# Patient Record
Sex: FEMALE | Race: BLACK OR AFRICAN AMERICAN | NOT HISPANIC OR LATINO
[De-identification: names, ages, dates, MRNs, and addresses within clinical notes are randomized per-mention and may not be internally consistent; named-entity substitution may affect disease eponyms.]

---

## 2018-04-25 ENCOUNTER — APPOINTMENT (OUTPATIENT)
Dept: OBGYN | Facility: CLINIC | Age: 63
End: 2018-04-25
Payer: MEDICARE

## 2018-04-25 VITALS
SYSTOLIC BLOOD PRESSURE: 134 MMHG | WEIGHT: 212.06 LBS | BODY MASS INDEX: 35.33 KG/M2 | HEIGHT: 65 IN | DIASTOLIC BLOOD PRESSURE: 78 MMHG

## 2018-04-25 PROCEDURE — G0101: CPT

## 2018-04-25 PROCEDURE — 82270 OCCULT BLOOD FECES: CPT

## 2019-04-29 ENCOUNTER — APPOINTMENT (OUTPATIENT)
Dept: OBGYN | Facility: CLINIC | Age: 64
End: 2019-04-29
Payer: MEDICARE

## 2019-04-29 VITALS
BODY MASS INDEX: 34.15 KG/M2 | SYSTOLIC BLOOD PRESSURE: 116 MMHG | HEIGHT: 64 IN | DIASTOLIC BLOOD PRESSURE: 78 MMHG | WEIGHT: 200 LBS | HEART RATE: 61 BPM

## 2019-04-29 DIAGNOSIS — Z78.9 OTHER SPECIFIED HEALTH STATUS: ICD-10-CM

## 2019-04-29 DIAGNOSIS — N89.8 OTHER SPECIFIED NONINFLAMMATORY DISORDERS OF VAGINA: ICD-10-CM

## 2019-04-29 PROCEDURE — 99213 OFFICE O/P EST LOW 20 MIN: CPT

## 2019-04-29 NOTE — CHIEF COMPLAINT
[Annual Visit] : annual visit [FreeTextEntry1] : 63 yo P1 LMP 1997 s/p TARYN presents for annual Gyn exam. She reports has bumps on genital. Reports had them for "years". She reports was told by Dr. Juarez "calcium build up."  Reports "bumps is not bothering me now". She reports any vulvar pain, no irritation. No other complaints.

## 2019-04-29 NOTE — HISTORY OF PRESENT ILLNESS
[___ Month(s) Ago] : [unfilled] month(s) ago [Last Mammogram ___] : Last Mammogram was [unfilled] [Last Bone Density ___] : Last bone density studies [unfilled] [Last Colonoscopy ___] : Last colonoscopy [unfilled] [Last Pap ___] : Last cervical pap smear was [unfilled] [Postmenopausal] : is postmenopausal [HIV] : HIV - [HSV] : HSV - [RPR] : RPR - [Hepatitis] : Hepatitis - [Chlamydia] : Chlamydia - [Gonorrhea] : Gonorrhea - [HPV] : HPV - [Sexually Active] : is not sexually active [Monogamous] : is not monogamous [de-identified] : Last coitus over 10  years

## 2019-04-29 NOTE — PHYSICAL EXAM
[Awake] : awake [Alert] : alert [Soft] : soft [Oriented x3] : oriented to person, place, and time [Normal] : vagina [No Bleeding] : there was no active vaginal bleeding [Absent] : absent [Adnexa Absent] : absent bilaterally [Acute Distress] : no acute distress [Mass] : no breast mass [Nipple Discharge] : no nipple discharge [Axillary LAD] : no axillary lymphadenopathy [Tender] : non tender

## 2019-04-30 LAB
CANDIDA VAG CYTO: NOT DETECTED
G VAGINALIS+PREV SP MTYP VAG QL MICRO: NOT DETECTED
T VAGINALIS VAG QL WET PREP: NOT DETECTED

## 2020-07-06 ENCOUNTER — APPOINTMENT (OUTPATIENT)
Dept: OBGYN | Facility: CLINIC | Age: 65
End: 2020-07-06
Payer: MEDICARE

## 2020-07-06 VITALS
SYSTOLIC BLOOD PRESSURE: 112 MMHG | DIASTOLIC BLOOD PRESSURE: 82 MMHG | BODY MASS INDEX: 35.34 KG/M2 | WEIGHT: 207 LBS | HEIGHT: 64 IN

## 2020-07-06 DIAGNOSIS — Z01.419 ENCOUNTER FOR GYNECOLOGICAL EXAMINATION (GENERAL) (ROUTINE) W/OUT ABNORMAL FINDINGS: ICD-10-CM

## 2020-07-06 PROCEDURE — G0101: CPT

## 2020-07-06 PROCEDURE — 36415 COLL VENOUS BLD VENIPUNCTURE: CPT

## 2020-07-06 NOTE — HISTORY OF PRESENT ILLNESS
[___ Year(s) Ago] : [unfilled] year(s) ago [Last Mammogram ___] : Last Mammogram was [unfilled] [Last Colonoscopy ___] : Last colonoscopy [unfilled] [Last Bone Density ___] : Last bone density studies [unfilled] [Last Pap ___] : Last cervical pap smear was [unfilled] [Definite:  ___ (Date)] : the last menstrual period was [unfilled] [Sexually Active] : is not sexually active

## 2020-07-06 NOTE — CHIEF COMPLAINT
[Annual Visit] : annual visit [FreeTextEntry1] : 66 yo P1 LMP 1997 with hx of TARYN presents for annual GYN exam. She reports external genitalia dryness which causes her to have vaginal itching. She reports has had this a dryness since 1997. She states she was told by her daughter to tell her doctor. She reports uses Vaseline with alleviation of symptoms. She denies vaginal dryness

## 2020-07-20 DIAGNOSIS — B96.89 ACUTE VAGINITIS: ICD-10-CM

## 2020-07-20 DIAGNOSIS — N76.0 ACUTE VAGINITIS: ICD-10-CM

## 2020-07-21 LAB
CANDIDA VAG CYTO: NOT DETECTED
G VAGINALIS+PREV SP MTYP VAG QL MICRO: DETECTED
HSV 1+2 IGG SER IA-IMP: POSITIVE
HSV 1+2 IGG SER IA-IMP: POSITIVE
HSV1 IGG SER QL: >62.2 INDEX
HSV1 IGM SER QL: NORMAL TITER
HSV2 AB FLD-ACNC: NORMAL TITER
HSV2 IGG SER QL: 7.3 INDEX
T VAGINALIS VAG QL WET PREP: NOT DETECTED

## 2020-12-23 PROBLEM — N76.0 BACTERIAL VAGINOSIS: Status: RESOLVED | Noted: 2020-07-21 | Resolved: 2020-12-23

## 2021-09-03 ENCOUNTER — APPOINTMENT (OUTPATIENT)
Dept: OBGYN | Facility: CLINIC | Age: 66
End: 2021-09-03
Payer: MEDICARE

## 2021-09-03 VITALS
WEIGHT: 218.5 LBS | HEIGHT: 64 IN | DIASTOLIC BLOOD PRESSURE: 83 MMHG | BODY MASS INDEX: 37.3 KG/M2 | SYSTOLIC BLOOD PRESSURE: 126 MMHG

## 2021-09-03 DIAGNOSIS — Z12.39 ENCOUNTER FOR OTHER SCREENING FOR MALIGNANT NEOPLASM OF BREAST: ICD-10-CM

## 2021-09-03 PROCEDURE — 99213 OFFICE O/P EST LOW 20 MIN: CPT

## 2021-09-03 NOTE — PHYSICAL EXAM
[Appropriately responsive] : appropriately responsive [Alert] : alert [No Acute Distress] : no acute distress [No Lymphadenopathy] : no lymphadenopathy [Regular Rate Rhythm] : regular rate rhythm [No Murmurs] : no murmurs [Clear to Auscultation B/L] : clear to auscultation bilaterally [Soft] : soft [Non-tender] : non-tender [Non-distended] : non-distended [No HSM] : No HSM [No Lesions] : no lesions [No Mass] : no mass [Oriented x3] : oriented x3 [Examination Of The Breasts] : a normal appearance [No Masses] : no breast masses were palpable [Vulvar Atrophy] : vulvar atrophy [Vulvar Scarring] : vulvar scarring [Labia Majora] : normal [Labia Minora] : normal [Normal] : normal [Atrophy] : atrophy [Cystocele] : a cystocele [Dry Mucosa] : dry mucosa [Absent] : absent [Uterine Adnexae] : normal

## 2021-09-13 ENCOUNTER — RESULT REVIEW (OUTPATIENT)
Age: 66
End: 2021-09-13

## 2021-09-13 ENCOUNTER — OUTPATIENT (OUTPATIENT)
Dept: OUTPATIENT SERVICES | Facility: HOSPITAL | Age: 66
LOS: 1 days | End: 2021-09-13
Payer: MEDICARE

## 2021-09-13 ENCOUNTER — APPOINTMENT (OUTPATIENT)
Dept: MAMMOGRAPHY | Facility: CLINIC | Age: 66
End: 2021-09-13
Payer: MEDICARE

## 2021-09-13 DIAGNOSIS — Z12.39 ENCOUNTER FOR OTHER SCREENING FOR MALIGNANT NEOPLASM OF BREAST: ICD-10-CM

## 2021-09-13 DIAGNOSIS — Z90.710 ACQUIRED ABSENCE OF BOTH CERVIX AND UTERUS: Chronic | ICD-10-CM

## 2021-09-13 DIAGNOSIS — Z98.89 OTHER SPECIFIED POSTPROCEDURAL STATES: Chronic | ICD-10-CM

## 2021-09-13 DIAGNOSIS — Z98.1 ARTHRODESIS STATUS: Chronic | ICD-10-CM

## 2021-09-13 PROCEDURE — 77067 SCR MAMMO BI INCL CAD: CPT

## 2021-09-13 PROCEDURE — 77063 BREAST TOMOSYNTHESIS BI: CPT

## 2021-09-13 PROCEDURE — 77063 BREAST TOMOSYNTHESIS BI: CPT | Mod: 26

## 2021-09-13 PROCEDURE — 77067 SCR MAMMO BI INCL CAD: CPT | Mod: 26

## 2021-09-14 DIAGNOSIS — N64.89 OTHER SPECIFIED DISORDERS OF BREAST: ICD-10-CM

## 2021-09-24 ENCOUNTER — OUTPATIENT (OUTPATIENT)
Dept: OUTPATIENT SERVICES | Facility: HOSPITAL | Age: 66
LOS: 1 days | End: 2021-09-24
Payer: MEDICARE

## 2021-09-24 ENCOUNTER — APPOINTMENT (OUTPATIENT)
Dept: ULTRASOUND IMAGING | Facility: CLINIC | Age: 66
End: 2021-09-24
Payer: MEDICARE

## 2021-09-24 ENCOUNTER — RESULT REVIEW (OUTPATIENT)
Age: 66
End: 2021-09-24

## 2021-09-24 DIAGNOSIS — Z98.89 OTHER SPECIFIED POSTPROCEDURAL STATES: Chronic | ICD-10-CM

## 2021-09-24 DIAGNOSIS — Z00.00 ENCOUNTER FOR GENERAL ADULT MEDICAL EXAMINATION WITHOUT ABNORMAL FINDINGS: ICD-10-CM

## 2021-09-24 DIAGNOSIS — Z98.1 ARTHRODESIS STATUS: Chronic | ICD-10-CM

## 2021-09-24 DIAGNOSIS — Z90.710 ACQUIRED ABSENCE OF BOTH CERVIX AND UTERUS: Chronic | ICD-10-CM

## 2021-09-24 PROCEDURE — 76642 ULTRASOUND BREAST LIMITED: CPT

## 2021-09-24 PROCEDURE — 76642 ULTRASOUND BREAST LIMITED: CPT | Mod: 26,LT

## 2022-01-05 ENCOUNTER — TRANSCRIPTION ENCOUNTER (OUTPATIENT)
Age: 67
End: 2022-01-05

## 2022-01-12 ENCOUNTER — TRANSCRIPTION ENCOUNTER (OUTPATIENT)
Age: 67
End: 2022-01-12

## 2022-03-25 ENCOUNTER — OUTPATIENT (OUTPATIENT)
Dept: OUTPATIENT SERVICES | Facility: HOSPITAL | Age: 67
LOS: 1 days | End: 2022-03-25

## 2022-03-25 DIAGNOSIS — Z98.89 OTHER SPECIFIED POSTPROCEDURAL STATES: Chronic | ICD-10-CM

## 2022-03-25 DIAGNOSIS — Z00.00 ENCOUNTER FOR GENERAL ADULT MEDICAL EXAMINATION WITHOUT ABNORMAL FINDINGS: ICD-10-CM

## 2022-03-25 DIAGNOSIS — Z98.1 ARTHRODESIS STATUS: Chronic | ICD-10-CM

## 2022-03-25 DIAGNOSIS — Z90.710 ACQUIRED ABSENCE OF BOTH CERVIX AND UTERUS: Chronic | ICD-10-CM

## 2022-04-13 ENCOUNTER — RESULT REVIEW (OUTPATIENT)
Age: 67
End: 2022-04-13

## 2022-04-13 ENCOUNTER — APPOINTMENT (OUTPATIENT)
Dept: MAMMOGRAPHY | Facility: CLINIC | Age: 67
End: 2022-04-13
Payer: MEDICARE

## 2022-04-13 ENCOUNTER — OUTPATIENT (OUTPATIENT)
Dept: OUTPATIENT SERVICES | Facility: HOSPITAL | Age: 67
LOS: 1 days | End: 2022-04-13
Payer: MEDICARE

## 2022-04-13 DIAGNOSIS — Z98.89 OTHER SPECIFIED POSTPROCEDURAL STATES: Chronic | ICD-10-CM

## 2022-04-13 DIAGNOSIS — Z90.710 ACQUIRED ABSENCE OF BOTH CERVIX AND UTERUS: Chronic | ICD-10-CM

## 2022-04-13 DIAGNOSIS — Z00.00 ENCOUNTER FOR GENERAL ADULT MEDICAL EXAMINATION WITHOUT ABNORMAL FINDINGS: ICD-10-CM

## 2022-04-13 DIAGNOSIS — Z98.1 ARTHRODESIS STATUS: Chronic | ICD-10-CM

## 2022-04-13 PROCEDURE — G0279: CPT

## 2022-04-13 PROCEDURE — 77065 DX MAMMO INCL CAD UNI: CPT | Mod: 26,LT

## 2022-04-13 PROCEDURE — G0279: CPT | Mod: 26

## 2022-04-13 PROCEDURE — 77065 DX MAMMO INCL CAD UNI: CPT

## 2022-09-21 ENCOUNTER — NON-APPOINTMENT (OUTPATIENT)
Age: 67
End: 2022-09-21

## 2022-10-06 ENCOUNTER — APPOINTMENT (OUTPATIENT)
Dept: OBGYN | Facility: CLINIC | Age: 67
End: 2022-10-06

## 2022-10-06 VITALS
BODY MASS INDEX: 35.85 KG/M2 | HEIGHT: 64 IN | WEIGHT: 210 LBS | SYSTOLIC BLOOD PRESSURE: 129 MMHG | DIASTOLIC BLOOD PRESSURE: 85 MMHG

## 2022-10-06 DIAGNOSIS — N95.2 POSTMENOPAUSAL ATROPHIC VAGINITIS: ICD-10-CM

## 2022-10-06 PROCEDURE — G0101: CPT

## 2022-10-06 PROCEDURE — 99397 PER PM REEVAL EST PAT 65+ YR: CPT | Mod: GY

## 2022-10-06 NOTE — PHYSICAL EXAM
[Chaperone Present] : A chaperone was present in the examining room during all aspects of the physical examination [FreeTextEntry1] : Linda [Appropriately responsive] : appropriately responsive [Alert] : alert [No Acute Distress] : no acute distress [No Lymphadenopathy] : no lymphadenopathy [Regular Rate Rhythm] : regular rate rhythm [No Murmurs] : no murmurs [Clear to Auscultation B/L] : clear to auscultation bilaterally [Soft] : soft [Non-tender] : non-tender [Non-distended] : non-distended [No HSM] : No HSM [No Lesions] : no lesions [No Mass] : no mass [Oriented x3] : oriented x3 [Examination Of The Breasts] : a normal appearance [No Masses] : no breast masses were palpable [Vulvar Atrophy] : vulvar atrophy [Vulvar Scarring] : vulvar scarring [Labia Majora] : normal [Labia Minora] : normal [Normal] : normal [Atrophy] : atrophy [Cystocele] : a cystocele [Dry Mucosa] : dry mucosa [Absent] : absent [Uterine Adnexae] : normal

## 2022-10-08 LAB
C TRACH RRNA SPEC QL NAA+PROBE: NOT DETECTED
HPV HIGH+LOW RISK DNA PNL CVX: NOT DETECTED
N GONORRHOEA RRNA SPEC QL NAA+PROBE: NOT DETECTED
SOURCE TP AMPLIFICATION: NORMAL

## 2022-10-11 ENCOUNTER — APPOINTMENT (OUTPATIENT)
Dept: OBGYN | Facility: CLINIC | Age: 67
End: 2022-10-11

## 2022-10-11 LAB — CYTOLOGY CVX/VAG DOC THIN PREP: ABNORMAL

## 2022-12-05 ENCOUNTER — OFFICE (OUTPATIENT)
Dept: URBAN - METROPOLITAN AREA CLINIC 115 | Facility: CLINIC | Age: 67
Setting detail: OPHTHALMOLOGY
End: 2022-12-05
Payer: MEDICARE

## 2022-12-05 DIAGNOSIS — H25.12: ICD-10-CM

## 2022-12-05 DIAGNOSIS — H40.1131: ICD-10-CM

## 2022-12-05 DIAGNOSIS — H25.13: ICD-10-CM

## 2022-12-05 PROBLEM — H25.11 CATARACT SENILE NUCLEAR SCLEROSIS; RIGHT EYE, LEFT EYE, BOTH EYES: Status: ACTIVE | Noted: 2022-12-05

## 2022-12-05 PROCEDURE — 99214 OFFICE O/P EST MOD 30 MIN: CPT | Performed by: OPHTHALMOLOGY

## 2022-12-05 PROCEDURE — 92133 CPTRZD OPH DX IMG PST SGM ON: CPT | Performed by: OPHTHALMOLOGY

## 2022-12-05 PROCEDURE — 92136 OPHTHALMIC BIOMETRY: CPT | Performed by: OPHTHALMOLOGY

## 2022-12-05 ASSESSMENT — PACHYMETRY
OS_CT_UM: 484
OS_CT_CORRECTION: 4
OD_CT_UM: 490
OD_CT_CORRECTION: 4

## 2022-12-05 ASSESSMENT — REFRACTION_CURRENTRX
OD_VPRISM_DIRECTION: PROGS
OS_CYLINDER: -0.50
OS_VPRISM_DIRECTION: PROGS
OD_SPHERE: -0.50
OD_ADD: +2.50
OS_AXIS: 083
OS_SPHERE: -1.75
OS_OVR_VA: 20/
OD_AXIS: 113
OS_ADD: +2.75
OD_CYLINDER: -1.00
OD_OVR_VA: 20/

## 2022-12-05 ASSESSMENT — REFRACTION_MANIFEST
OS_ADD: +2.50
OS_CYLINDER: -1.00
OS_AXIS: 090
OU_VA: 20/NI
OD_SPHERE: +0.25
OD_ADD: +2.50
OD_VA1: 20/NI
OD_CYLINDER: -1.00
OS_SPHERE: -2.00
OD_AXIS: 110
OS_VA1: 20/NI

## 2022-12-05 ASSESSMENT — SPHEQUIV_DERIVED
OS_SPHEQUIV: -2.625
OD_SPHEQUIV: -0.5
OD_SPHEQUIV: -0.25
OS_SPHEQUIV: -2.5

## 2022-12-05 ASSESSMENT — CONFRONTATIONAL VISUAL FIELD TEST (CVF)
OS_FINDINGS: FULL
OD_FINDINGS: FULL

## 2022-12-05 ASSESSMENT — REFRACTION_AUTOREFRACTION
OS_AXIS: 079
OD_AXIS: 108
OD_CYLINDER: -1.50
OD_SPHERE: +0.25
OS_SPHERE: -2.00
OS_CYLINDER: -1.25

## 2022-12-05 ASSESSMENT — VISUAL ACUITY
OD_BCVA: 20/80
OS_BCVA: 20/60

## 2022-12-05 ASSESSMENT — TONOMETRY: OS_IOP_MMHG: 11

## 2022-12-07 ENCOUNTER — NON-APPOINTMENT (OUTPATIENT)
Age: 67
End: 2022-12-07

## 2023-02-06 ENCOUNTER — OFFICE (OUTPATIENT)
Dept: URBAN - METROPOLITAN AREA CLINIC 115 | Facility: CLINIC | Age: 68
Setting detail: OPHTHALMOLOGY
End: 2023-02-06
Payer: MEDICARE

## 2023-02-06 DIAGNOSIS — Z01.812: ICD-10-CM

## 2023-02-06 DIAGNOSIS — Z20.822: ICD-10-CM

## 2023-02-06 PROCEDURE — 99211 OFF/OP EST MAY X REQ PHY/QHP: CPT | Performed by: OPHTHALMOLOGY

## 2023-02-07 ASSESSMENT — REFRACTION_AUTOREFRACTION
OS_CYLINDER: -1.25
OD_CYLINDER: -1.50
OS_SPHERE: -2.00
OS_AXIS: 079
OD_AXIS: 108
OD_SPHERE: +0.25

## 2023-02-07 ASSESSMENT — SPHEQUIV_DERIVED
OS_SPHEQUIV: -2.5
OS_SPHEQUIV: -2.625
OD_SPHEQUIV: -0.5
OD_SPHEQUIV: -0.25

## 2023-02-07 ASSESSMENT — REFRACTION_CURRENTRX
OS_SPHERE: -1.75
OD_ADD: +2.50
OS_OVR_VA: 20/
OD_AXIS: 113
OD_SPHERE: -0.50
OS_CYLINDER: -0.50
OS_AXIS: 083
OD_VPRISM_DIRECTION: PROGS
OS_VPRISM_DIRECTION: PROGS
OD_CYLINDER: -1.00
OD_OVR_VA: 20/
OS_ADD: +2.75

## 2023-02-07 ASSESSMENT — REFRACTION_MANIFEST
OS_ADD: +2.50
OD_VA1: 20/NI
OD_CYLINDER: -1.00
OD_ADD: +2.50
OD_SPHERE: +0.25
OS_CYLINDER: -1.00
OS_AXIS: 090
OD_AXIS: 110
OS_SPHERE: -2.00
OU_VA: 20/NI
OS_VA1: 20/NI

## 2023-02-07 ASSESSMENT — VISUAL ACUITY
OS_BCVA: 20/60
OD_BCVA: 20/80

## 2023-02-09 ENCOUNTER — ASC (OUTPATIENT)
Dept: URBAN - METROPOLITAN AREA SURGERY 8 | Facility: SURGERY | Age: 68
Setting detail: OPHTHALMOLOGY
End: 2023-02-09
Payer: MEDICARE

## 2023-02-09 DIAGNOSIS — H25.12: ICD-10-CM

## 2023-02-09 DIAGNOSIS — H52.212: ICD-10-CM

## 2023-02-09 DIAGNOSIS — H40.1421: ICD-10-CM

## 2023-02-09 PROCEDURE — FEMTO CATARACT LASER: Performed by: OPHTHALMOLOGY

## 2023-02-09 PROCEDURE — 65820 GONIOTOMY: CPT | Performed by: OPHTHALMOLOGY

## 2023-02-09 PROCEDURE — 66984 XCAPSL CTRC RMVL W/O ECP: CPT | Performed by: OPHTHALMOLOGY

## 2023-02-10 ENCOUNTER — OFFICE (OUTPATIENT)
Dept: URBAN - METROPOLITAN AREA CLINIC 115 | Facility: CLINIC | Age: 68
Setting detail: OPHTHALMOLOGY
End: 2023-02-10
Payer: MEDICARE

## 2023-02-10 DIAGNOSIS — Z96.1: ICD-10-CM

## 2023-02-10 DIAGNOSIS — H25.11: ICD-10-CM

## 2023-02-10 PROCEDURE — 99024 POSTOP FOLLOW-UP VISIT: CPT | Performed by: OPHTHALMOLOGY

## 2023-02-10 PROCEDURE — 92136 OPHTHALMIC BIOMETRY: CPT | Performed by: OPHTHALMOLOGY

## 2023-02-10 ASSESSMENT — CONFRONTATIONAL VISUAL FIELD TEST (CVF)
OS_FINDINGS: FULL
OD_FINDINGS: FULL

## 2023-02-10 ASSESSMENT — REFRACTION_AUTOREFRACTION
OS_SPHERE: -2.00
OD_SPHERE: +0.25
OS_AXIS: 079
OD_CYLINDER: -1.50
OS_CYLINDER: -1.25
OD_AXIS: 108

## 2023-02-10 ASSESSMENT — REFRACTION_MANIFEST
OD_SPHERE: +0.25
OS_SPHERE: -2.00
OU_VA: 20/NI
OS_VA1: 20/NI
OD_ADD: +2.50
OS_CYLINDER: -1.00
OS_ADD: +2.50
OD_AXIS: 110
OD_CYLINDER: -1.00
OS_AXIS: 090
OD_VA1: 20/NI

## 2023-02-10 ASSESSMENT — REFRACTION_CURRENTRX
OD_VPRISM_DIRECTION: PROGS
OD_AXIS: 113
OS_OVR_VA: 20/
OS_CYLINDER: -0.50
OD_ADD: +2.50
OD_SPHERE: -0.50
OS_ADD: +2.75
OD_OVR_VA: 20/
OS_AXIS: 083
OS_SPHERE: -1.75
OD_CYLINDER: -1.00
OS_VPRISM_DIRECTION: PROGS

## 2023-02-10 ASSESSMENT — VISUAL ACUITY
OS_BCVA: 20/60-2
OD_BCVA: 20/40-1

## 2023-02-10 ASSESSMENT — SPHEQUIV_DERIVED
OS_SPHEQUIV: -2.625
OS_SPHEQUIV: -2.5
OD_SPHEQUIV: -0.5
OD_SPHEQUIV: -0.25

## 2023-02-10 ASSESSMENT — PACHYMETRY
OS_CT_CORRECTION: 4
OS_CT_UM: 484
OD_CT_CORRECTION: 4
OD_CT_UM: 490

## 2023-02-10 ASSESSMENT — TONOMETRY
OD_IOP_MMHG: 12
OS_IOP_MMHG: 11

## 2023-02-20 ENCOUNTER — OFFICE (OUTPATIENT)
Dept: URBAN - METROPOLITAN AREA CLINIC 115 | Facility: CLINIC | Age: 68
Setting detail: OPHTHALMOLOGY
End: 2023-02-20
Payer: MEDICARE

## 2023-02-20 DIAGNOSIS — Z20.822: ICD-10-CM

## 2023-02-20 DIAGNOSIS — Z01.812: ICD-10-CM

## 2023-02-20 PROCEDURE — 99211 OFF/OP EST MAY X REQ PHY/QHP: CPT | Performed by: OPHTHALMOLOGY

## 2023-02-21 ASSESSMENT — REFRACTION_CURRENTRX
OD_AXIS: 113
OS_AXIS: 083
OD_VPRISM_DIRECTION: PROGS
OS_VPRISM_DIRECTION: PROGS
OD_CYLINDER: -1.00
OS_CYLINDER: -0.50
OS_OVR_VA: 20/
OD_SPHERE: -0.50
OD_OVR_VA: 20/
OD_ADD: +2.50
OS_ADD: +2.75
OS_SPHERE: -1.75

## 2023-02-21 ASSESSMENT — SPHEQUIV_DERIVED
OS_SPHEQUIV: -2.625
OS_SPHEQUIV: -2.5
OD_SPHEQUIV: -0.5
OD_SPHEQUIV: -0.25

## 2023-02-21 ASSESSMENT — REFRACTION_MANIFEST
OS_SPHERE: -2.00
OU_VA: 20/NI
OD_AXIS: 110
OS_ADD: +2.50
OD_SPHERE: +0.25
OD_VA1: 20/NI
OS_CYLINDER: -1.00
OD_ADD: +2.50
OD_CYLINDER: -1.00
OS_AXIS: 090
OS_VA1: 20/NI

## 2023-02-21 ASSESSMENT — REFRACTION_AUTOREFRACTION
OS_AXIS: 079
OD_CYLINDER: -1.50
OS_CYLINDER: -1.25
OD_AXIS: 108
OS_SPHERE: -2.00
OD_SPHERE: +0.25

## 2023-02-21 ASSESSMENT — VISUAL ACUITY
OS_BCVA: 20/60-2
OD_BCVA: 20/40-1

## 2023-02-23 ENCOUNTER — ASC (OUTPATIENT)
Dept: URBAN - METROPOLITAN AREA SURGERY 8 | Facility: SURGERY | Age: 68
Setting detail: OPHTHALMOLOGY
End: 2023-02-23
Payer: MEDICARE

## 2023-02-23 DIAGNOSIS — H40.1411: ICD-10-CM

## 2023-02-23 DIAGNOSIS — H25.11: ICD-10-CM

## 2023-02-23 DIAGNOSIS — H52.211: ICD-10-CM

## 2023-02-23 PROCEDURE — FEMTO CATARACT LASER: Performed by: OPHTHALMOLOGY

## 2023-02-23 PROCEDURE — 66991 XCAPSL CTRC RMVL INSJ 1+: CPT | Performed by: OPHTHALMOLOGY

## 2023-02-24 ENCOUNTER — OFFICE (OUTPATIENT)
Dept: URBAN - METROPOLITAN AREA CLINIC 115 | Facility: CLINIC | Age: 68
Setting detail: OPHTHALMOLOGY
End: 2023-02-24
Payer: MEDICARE

## 2023-02-24 ENCOUNTER — RX ONLY (RX ONLY)
Age: 68
End: 2023-02-24

## 2023-02-24 DIAGNOSIS — Z96.1: ICD-10-CM

## 2023-02-24 PROCEDURE — 99024 POSTOP FOLLOW-UP VISIT: CPT | Performed by: OPTOMETRIST

## 2023-02-24 ASSESSMENT — CORNEAL EDEMA - FOLDS/STRIAE: OD_FOLDSSTRIAE: T

## 2023-02-24 ASSESSMENT — REFRACTION_MANIFEST
OS_ADD: +2.50
OD_AXIS: 110
OD_ADD: +2.50
OD_CYLINDER: -1.00
OU_VA: 20/NI
OD_SPHERE: +0.25
OS_AXIS: 090
OS_VA1: 20/NI
OD_VA1: 20/NI
OS_CYLINDER: -1.00
OS_SPHERE: -2.00

## 2023-02-24 ASSESSMENT — REFRACTION_AUTOREFRACTION
OS_SPHERE: +0.25
OS_AXIS: 034
OD_SPHERE: UTP
OD_AXIS: UTP
OS_CYLINDER: -0.75
OD_CYLINDER: UTP

## 2023-02-24 ASSESSMENT — SPHEQUIV_DERIVED
OS_SPHEQUIV: -0.125
OD_SPHEQUIV: -0.25
OS_SPHEQUIV: -2.5

## 2023-02-24 ASSESSMENT — CONFRONTATIONAL VISUAL FIELD TEST (CVF)
OS_FINDINGS: FULL
OD_FINDINGS: FULL

## 2023-02-24 ASSESSMENT — REFRACTION_CURRENTRX
OS_ADD: +2.75
OD_ADD: +2.50
OD_SPHERE: -0.50
OD_VPRISM_DIRECTION: PROGS
OD_CYLINDER: -1.00
OS_CYLINDER: -0.50
OD_OVR_VA: 20/
OS_SPHERE: -1.75
OD_AXIS: 113
OS_VPRISM_DIRECTION: PROGS
OS_AXIS: 083
OS_OVR_VA: 20/

## 2023-02-24 ASSESSMENT — VISUAL ACUITY
OS_BCVA: 20/70
OD_BCVA: 20/30-1

## 2023-03-06 ENCOUNTER — OFFICE (OUTPATIENT)
Dept: URBAN - METROPOLITAN AREA CLINIC 115 | Facility: CLINIC | Age: 68
Setting detail: OPHTHALMOLOGY
End: 2023-03-06
Payer: MEDICARE

## 2023-03-06 DIAGNOSIS — Z96.1: ICD-10-CM

## 2023-03-06 DIAGNOSIS — H40.1131: ICD-10-CM

## 2023-03-06 PROBLEM — Z01.812 ENCOUNTER FOR PREPROCEDURAL LABORATORY EXAMINATION: Status: ACTIVE | Noted: 2023-02-07

## 2023-03-06 PROCEDURE — 99024 POSTOP FOLLOW-UP VISIT: CPT | Performed by: OPHTHALMOLOGY

## 2023-03-06 ASSESSMENT — REFRACTION_MANIFEST
OS_CYLINDER: -1.00
OD_SPHERE: +0.25
OU_VA: 20/NI
OD_CYLINDER: -1.00
OD_AXIS: 110
OS_VA1: 20/NI
OS_AXIS: 090
OS_SPHERE: -2.00
OD_VA1: 20/NI
OD_ADD: +2.50
OS_ADD: +2.50

## 2023-03-06 ASSESSMENT — REFRACTION_CURRENTRX
OD_AXIS: 113
OS_OVR_VA: 20/
OS_CYLINDER: -0.50
OS_VPRISM_DIRECTION: PROGS
OD_CYLINDER: -1.00
OD_SPHERE: -0.50
OS_AXIS: 083
OD_ADD: +2.50
OS_SPHERE: -1.75
OD_OVR_VA: 20/
OS_ADD: +2.75
OD_VPRISM_DIRECTION: PROGS

## 2023-03-06 ASSESSMENT — PACHYMETRY
OD_CT_UM: 490
OD_CT_CORRECTION: 4
OS_CT_CORRECTION: 4
OS_CT_UM: 484

## 2023-03-06 ASSESSMENT — REFRACTION_AUTOREFRACTION
OD_AXIS: 169
OS_SPHERE: +0.50
OD_SPHERE: +0.50
OD_CYLINDER: -1.00
OS_CYLINDER: -1.00
OS_AXIS: 043

## 2023-03-06 ASSESSMENT — VISUAL ACUITY
OS_BCVA: 20/40-1
OD_BCVA: 20/40

## 2023-03-06 ASSESSMENT — SPHEQUIV_DERIVED
OD_SPHEQUIV: -0.25
OS_SPHEQUIV: 0
OS_SPHEQUIV: -2.5
OD_SPHEQUIV: 0

## 2023-03-06 ASSESSMENT — CONFRONTATIONAL VISUAL FIELD TEST (CVF)
OS_FINDINGS: FULL
OD_FINDINGS: FULL

## 2023-03-22 ENCOUNTER — OFFICE (OUTPATIENT)
Dept: URBAN - METROPOLITAN AREA CLINIC 115 | Facility: CLINIC | Age: 68
Setting detail: OPHTHALMOLOGY
End: 2023-03-22
Payer: MEDICARE

## 2023-03-22 ENCOUNTER — RX ONLY (RX ONLY)
Age: 68
End: 2023-03-22

## 2023-03-22 DIAGNOSIS — Z96.1: ICD-10-CM

## 2023-03-22 PROBLEM — H52.03 HYPERMETROPIA; BOTH EYES: Status: ACTIVE | Noted: 2023-03-22

## 2023-03-22 PROCEDURE — 99024 POSTOP FOLLOW-UP VISIT: CPT | Performed by: OPTOMETRIST

## 2023-03-22 ASSESSMENT — REFRACTION_CURRENTRX
OS_CYLINDER: -0.50
OS_ADD: +2.75
OD_OVR_VA: 20/
OD_VPRISM_DIRECTION: PROGS
OS_AXIS: 083
OD_SPHERE: -0.50
OS_VPRISM_DIRECTION: PROGS
OD_AXIS: 113
OS_OVR_VA: 20/
OS_SPHERE: -1.75
OD_CYLINDER: -1.00
OD_ADD: +2.50

## 2023-03-22 ASSESSMENT — REFRACTION_MANIFEST
OU_VA: 20/NI
OD_VA1: 20/NI
OS_SPHERE: 0.00
OS_AXIS: 039
OD_CYLINDER: -0.50
OD_SPHERE: +0.25
OD_VA1: 20/20
OD_AXIS: 147
OS_ADD: +2.50
OD_AXIS: 110
OD_ADD: +2.50
OD_ADD: +2.75
OS_VA1: 20/NI
OD_SPHERE: 0.00
OS_VA1: 20/20-1
OS_CYLINDER: -1.00
OS_CYLINDER: -0.75
OS_AXIS: 090
OD_CYLINDER: -1.00
OS_SPHERE: -2.00
OS_ADD: +2.75

## 2023-03-22 ASSESSMENT — SPHEQUIV_DERIVED
OS_SPHEQUIV: -2.5
OS_SPHEQUIV: -0.375
OD_SPHEQUIV: -0.25
OS_SPHEQUIV: -0.375
OD_SPHEQUIV: -0.25
OD_SPHEQUIV: -0.25

## 2023-03-22 ASSESSMENT — REFRACTION_AUTOREFRACTION
OS_SPHERE: 0.00
OD_CYLINDER: -0.50
OS_AXIS: 039
OS_CYLINDER: -0.75
OD_SPHERE: 0.00
OD_AXIS: 147

## 2023-03-22 ASSESSMENT — VISUAL ACUITY
OD_BCVA: 20/20
OS_BCVA: 20/20

## 2023-03-22 ASSESSMENT — CONFRONTATIONAL VISUAL FIELD TEST (CVF)
OS_FINDINGS: FULL
OD_FINDINGS: FULL

## 2023-03-22 ASSESSMENT — PACHYMETRY
OS_CT_UM: 484
OS_CT_CORRECTION: 4
OD_CT_UM: 490
OD_CT_CORRECTION: 4

## 2023-05-23 ENCOUNTER — OUTPATIENT (OUTPATIENT)
Dept: OUTPATIENT SERVICES | Facility: HOSPITAL | Age: 68
LOS: 1 days | End: 2023-05-23
Payer: MEDICARE

## 2023-05-23 ENCOUNTER — RESULT REVIEW (OUTPATIENT)
Age: 68
End: 2023-05-23

## 2023-05-23 ENCOUNTER — APPOINTMENT (OUTPATIENT)
Dept: MAMMOGRAPHY | Facility: CLINIC | Age: 68
End: 2023-05-23
Payer: MEDICARE

## 2023-05-23 DIAGNOSIS — Z90.710 ACQUIRED ABSENCE OF BOTH CERVIX AND UTERUS: Chronic | ICD-10-CM

## 2023-05-23 DIAGNOSIS — Z98.89 OTHER SPECIFIED POSTPROCEDURAL STATES: Chronic | ICD-10-CM

## 2023-05-23 DIAGNOSIS — Z98.1 ARTHRODESIS STATUS: Chronic | ICD-10-CM

## 2023-05-23 DIAGNOSIS — Z12.31 ENCOUNTER FOR SCREENING MAMMOGRAM FOR MALIGNANT NEOPLASM OF BREAST: ICD-10-CM

## 2023-05-23 PROCEDURE — 77067 SCR MAMMO BI INCL CAD: CPT | Mod: 26

## 2023-05-23 PROCEDURE — 77067 SCR MAMMO BI INCL CAD: CPT

## 2023-05-23 PROCEDURE — 77063 BREAST TOMOSYNTHESIS BI: CPT | Mod: 26

## 2023-05-23 PROCEDURE — 77063 BREAST TOMOSYNTHESIS BI: CPT

## 2023-05-25 ENCOUNTER — APPOINTMENT (OUTPATIENT)
Dept: OBGYN | Facility: CLINIC | Age: 68
End: 2023-05-25
Payer: MEDICARE

## 2023-05-25 VITALS
DIASTOLIC BLOOD PRESSURE: 75 MMHG | WEIGHT: 207 LBS | BODY MASS INDEX: 35.34 KG/M2 | SYSTOLIC BLOOD PRESSURE: 110 MMHG | HEIGHT: 64 IN

## 2023-05-25 DIAGNOSIS — N90.89 OTHER SPECIFIED NONINFLAMMATORY DISORDERS OF VULVA AND PERINEUM: ICD-10-CM

## 2023-05-25 PROCEDURE — 99214 OFFICE O/P EST MOD 30 MIN: CPT

## 2023-05-25 NOTE — PHYSICAL EXAM
[Chaperone Present] : A chaperone was present in the examining room during all aspects of the physical examination [FreeTextEntry1] : Linda [Vulvar Atrophy] : vulvar atrophy [Atrophy] : atrophy [Absent] : absent [de-identified] : Inclusion cysts

## 2023-06-29 ENCOUNTER — APPOINTMENT (OUTPATIENT)
Dept: OBGYN | Facility: CLINIC | Age: 68
End: 2023-06-29
Payer: MEDICARE

## 2023-06-29 VITALS — SYSTOLIC BLOOD PRESSURE: 110 MMHG | DIASTOLIC BLOOD PRESSURE: 60 MMHG | HEIGHT: 64 IN

## 2023-06-29 DIAGNOSIS — N90.7 VULVAR CYST: ICD-10-CM

## 2023-06-29 PROCEDURE — 57135 EXCISION VAGINAL CYST/TUMOR: CPT

## 2023-07-06 LAB — CORE LAB BIOPSY: NORMAL

## 2023-07-25 ENCOUNTER — APPOINTMENT (OUTPATIENT)
Dept: OBGYN | Facility: CLINIC | Age: 68
End: 2023-07-25
Payer: MEDICARE

## 2023-07-25 VITALS — HEIGHT: 64 IN | SYSTOLIC BLOOD PRESSURE: 122 MMHG | DIASTOLIC BLOOD PRESSURE: 77 MMHG

## 2023-07-25 PROCEDURE — 99214 OFFICE O/P EST MOD 30 MIN: CPT

## 2023-08-15 ENCOUNTER — OFFICE (OUTPATIENT)
Dept: URBAN - METROPOLITAN AREA CLINIC 115 | Facility: CLINIC | Age: 68
Setting detail: OPHTHALMOLOGY
End: 2023-08-15
Payer: MEDICARE

## 2023-08-15 DIAGNOSIS — H16.9: ICD-10-CM

## 2023-08-15 PROCEDURE — 92012 INTRM OPH EXAM EST PATIENT: CPT | Performed by: OPHTHALMOLOGY

## 2023-08-15 ASSESSMENT — REFRACTION_CURRENTRX
OS_VPRISM_DIRECTION: PROGS
OD_VPRISM_DIRECTION: PROGS
OD_OVR_VA: 20/
OD_SPHERE: -0.50
OD_CYLINDER: -1.00
OS_ADD: +2.75
OS_OVR_VA: 20/
OS_AXIS: 083
OS_CYLINDER: -0.50
OD_ADD: +2.50
OD_AXIS: 113
OS_SPHERE: -1.75

## 2023-08-15 ASSESSMENT — REFRACTION_MANIFEST
OD_AXIS: 147
OD_ADD: +2.75
OS_SPHERE: -2.00
OD_VA1: 20/NI
OD_CYLINDER: -0.50
OD_CYLINDER: -1.00
OD_SPHERE: +0.25
OD_SPHERE: 0.00
OS_SPHERE: 0.00
OU_VA: 20/NI
OD_VA1: 20/20
OS_AXIS: 039
OS_ADD: +2.50
OS_CYLINDER: -1.00
OD_ADD: +2.50
OS_AXIS: 090
OD_AXIS: 110
OS_ADD: +2.75
OS_VA1: 20/NI
OS_CYLINDER: -0.75
OS_VA1: 20/20-1

## 2023-08-15 ASSESSMENT — SPHEQUIV_DERIVED
OD_SPHEQUIV: 0.25
OD_SPHEQUIV: -0.25
OS_SPHEQUIV: -2.5
OS_SPHEQUIV: -0.375
OD_SPHEQUIV: -0.25
OS_SPHEQUIV: 0.125

## 2023-08-15 ASSESSMENT — PACHYMETRY
OS_CT_UM: 484
OS_CT_CORRECTION: 4
OD_CT_UM: 490
OD_CT_CORRECTION: 4

## 2023-08-15 ASSESSMENT — VISUAL ACUITY
OD_BCVA: 20/25
OS_BCVA: 20/35-1

## 2023-08-15 ASSESSMENT — TONOMETRY
OS_IOP_MMHG: 11
OD_IOP_MMHG: 10

## 2023-08-15 ASSESSMENT — CONFRONTATIONAL VISUAL FIELD TEST (CVF)
OD_FINDINGS: FULL
OS_FINDINGS: FULL

## 2023-08-15 ASSESSMENT — SUPERFICIAL PUNCTATE KERATITIS (SPK)
OS_SPK: MILD
OD_SPK: TRACE

## 2023-08-15 ASSESSMENT — REFRACTION_AUTOREFRACTION
OD_SPHERE: +0.75
OS_AXIS: 076
OS_SPHERE: +0.50
OD_AXIS: 113
OD_CYLINDER: -1.00
OS_CYLINDER: -0.75

## 2023-08-26 ENCOUNTER — OFFICE (OUTPATIENT)
Dept: URBAN - METROPOLITAN AREA CLINIC 115 | Facility: CLINIC | Age: 68
Setting detail: OPHTHALMOLOGY
End: 2023-08-26
Payer: MEDICARE

## 2023-08-26 ENCOUNTER — RX ONLY (RX ONLY)
Age: 68
End: 2023-08-26

## 2023-08-26 DIAGNOSIS — H40.1131: ICD-10-CM

## 2023-08-26 DIAGNOSIS — H16.9: ICD-10-CM

## 2023-08-26 PROCEDURE — 92012 INTRM OPH EXAM EST PATIENT: CPT | Performed by: OPHTHALMOLOGY

## 2023-08-26 ASSESSMENT — REFRACTION_CURRENTRX
OD_OVR_VA: 20/
OS_CYLINDER: -0.50
OS_ADD: +2.75
OS_AXIS: 083
OS_OVR_VA: 20/
OS_VPRISM_DIRECTION: PROGS
OD_VPRISM_DIRECTION: PROGS
OD_AXIS: 113
OD_ADD: +2.50
OD_SPHERE: -0.50
OS_SPHERE: -1.75
OD_CYLINDER: -1.00

## 2023-08-26 ASSESSMENT — PACHYMETRY
OS_CT_CORRECTION: 4
OD_CT_UM: 490
OS_CT_UM: 484
OD_CT_CORRECTION: 4

## 2023-08-26 ASSESSMENT — REFRACTION_MANIFEST
OS_CYLINDER: -0.75
OU_VA: 20/NI
OS_ADD: +2.75
OS_AXIS: 039
OD_ADD: +2.75
OD_SPHERE: 0.00
OD_CYLINDER: -0.50
OD_CYLINDER: -1.00
OD_VA1: 20/NI
OD_AXIS: 110
OS_VA1: 20/NI
OD_VA1: 20/20
OD_AXIS: 147
OD_SPHERE: +0.25
OS_SPHERE: -2.00
OS_CYLINDER: -1.00
OS_SPHERE: 0.00
OS_ADD: +2.50
OS_AXIS: 090
OS_VA1: 20/20-1
OD_ADD: +2.50

## 2023-08-26 ASSESSMENT — TONOMETRY
OS_IOP_MMHG: 11
OD_IOP_MMHG: 10

## 2023-08-26 ASSESSMENT — SPHEQUIV_DERIVED
OS_SPHEQUIV: 0.125
OS_SPHEQUIV: -0.375
OD_SPHEQUIV: 0.25
OD_SPHEQUIV: -0.25
OS_SPHEQUIV: -2.5
OD_SPHEQUIV: -0.25

## 2023-08-26 ASSESSMENT — CONFRONTATIONAL VISUAL FIELD TEST (CVF)
OS_FINDINGS: FULL
OD_FINDINGS: FULL

## 2023-08-26 ASSESSMENT — VISUAL ACUITY
OD_BCVA: 20/25-1
OS_BCVA: 20/30-1

## 2023-08-26 ASSESSMENT — REFRACTION_AUTOREFRACTION
OD_SPHERE: +0.75
OD_CYLINDER: -1.00
OS_SPHERE: +0.50
OS_CYLINDER: -0.75
OS_AXIS: 076
OD_AXIS: 113

## 2023-08-26 ASSESSMENT — SUPERFICIAL PUNCTATE KERATITIS (SPK)
OS_SPK: MILD
OD_SPK: TRACE

## 2023-12-12 ENCOUNTER — OFFICE (OUTPATIENT)
Dept: URBAN - METROPOLITAN AREA CLINIC 115 | Facility: CLINIC | Age: 68
Setting detail: OPHTHALMOLOGY
End: 2023-12-12
Payer: MEDICARE

## 2023-12-12 DIAGNOSIS — H35.033: ICD-10-CM

## 2023-12-12 DIAGNOSIS — H16.9: ICD-10-CM

## 2023-12-12 DIAGNOSIS — H40.1131: ICD-10-CM

## 2023-12-12 DIAGNOSIS — Z96.1: ICD-10-CM

## 2023-12-12 PROCEDURE — 92133 CPTRZD OPH DX IMG PST SGM ON: CPT | Performed by: OPHTHALMOLOGY

## 2023-12-12 PROCEDURE — 92012 INTRM OPH EXAM EST PATIENT: CPT | Performed by: OPHTHALMOLOGY

## 2023-12-12 ASSESSMENT — REFRACTION_AUTOREFRACTION
OD_CYLINDER: -0.50
OD_SPHERE: +0.25
OS_AXIS: 075
OS_CYLINDER: -0.50
OS_SPHERE: +0.25
OD_AXIS: 109

## 2023-12-12 ASSESSMENT — CONFRONTATIONAL VISUAL FIELD TEST (CVF)
OD_FINDINGS: FULL
OS_FINDINGS: FULL

## 2023-12-12 ASSESSMENT — REFRACTION_CURRENTRX
OD_CYLINDER: -1.00
OS_ADD: +2.75
OS_VPRISM_DIRECTION: PROGS
OS_AXIS: 083
OS_OVR_VA: 20/
OS_SPHERE: -1.75
OS_CYLINDER: -0.50
OD_VPRISM_DIRECTION: PROGS
OD_AXIS: 113
OD_SPHERE: -0.50
OD_ADD: +2.50
OD_OVR_VA: 20/

## 2023-12-12 ASSESSMENT — SPHEQUIV_DERIVED
OD_SPHEQUIV: -0.25
OD_SPHEQUIV: -0.25
OD_SPHEQUIV: 0
OS_SPHEQUIV: -2.5
OS_SPHEQUIV: 0
OS_SPHEQUIV: -0.375

## 2023-12-12 ASSESSMENT — REFRACTION_MANIFEST
OD_CYLINDER: -1.00
OD_ADD: +2.50
OS_ADD: +2.75
OS_SPHERE: 0.00
OD_VA1: 20/20
OD_VA1: 20/NI
OU_VA: 20/NI
OS_AXIS: 039
OS_AXIS: 090
OD_SPHERE: 0.00
OD_ADD: +2.75
OS_CYLINDER: -1.00
OS_SPHERE: -2.00
OS_VA1: 20/20-1
OD_AXIS: 147
OD_AXIS: 110
OD_CYLINDER: -0.50
OD_SPHERE: +0.25
OS_ADD: +2.50
OS_CYLINDER: -0.75
OS_VA1: 20/NI

## 2023-12-12 ASSESSMENT — SUPERFICIAL PUNCTATE KERATITIS (SPK)
OD_SPK: TRACE
OS_SPK: MILD

## 2024-01-22 ENCOUNTER — APPOINTMENT (OUTPATIENT)
Dept: OBGYN | Facility: CLINIC | Age: 69
End: 2024-01-22
Payer: MEDICARE

## 2024-01-22 VITALS
WEIGHT: 202.2 LBS | HEIGHT: 64 IN | BODY MASS INDEX: 34.52 KG/M2 | DIASTOLIC BLOOD PRESSURE: 84 MMHG | SYSTOLIC BLOOD PRESSURE: 112 MMHG

## 2024-01-22 DIAGNOSIS — N94.9 UNSPECIFIED CONDITION ASSOCIATED WITH FEMALE GENITAL ORGANS AND MENSTRUAL CYCLE: ICD-10-CM

## 2024-01-22 DIAGNOSIS — N90.5 ATROPHY OF VULVA: ICD-10-CM

## 2024-01-22 DIAGNOSIS — N81.11 CYSTOCELE, MIDLINE: ICD-10-CM

## 2024-01-22 PROCEDURE — 99397 PER PM REEVAL EST PAT 65+ YR: CPT

## 2024-01-22 RX ORDER — FLUCONAZOLE 150 MG/1
150 TABLET ORAL DAILY
Qty: 1 | Refills: 1 | Status: DISCONTINUED | COMMUNITY
Start: 2020-07-21 | End: 2024-01-22

## 2024-01-22 RX ORDER — TINIDAZOLE 500 MG/1
500 TABLET, FILM COATED ORAL DAILY
Qty: 8 | Refills: 1 | Status: DISCONTINUED | COMMUNITY
Start: 2020-07-21 | End: 2024-01-22

## 2024-01-22 NOTE — DISCUSSION/SUMMARY
[FreeTextEntry1] : 68-year-old -0-1-1 status post TARYN presents with right vulvar cyst and cystocele.  Physical exam showed the vulvar cyst and evidence of vulvovaginal atrophy.  Pap GC chlamydia sent, bone density ordered.  Return in 4 weeks for excision of the vulvar cyst.

## 2024-01-22 NOTE — HISTORY OF PRESENT ILLNESS
[Y] : Positive pregnancy history [N] : Patient is not sexually active [Menarche Age: ____] : age at menarche was [unfilled] [FreeTextEntry1] : 68-year-old -0-1-1 status post TARYN presents for GYN evaluation.  She complains of right vulvar cyst which mostly is nontender.  Denies discharge or itching and also denies stress incontinence. [PGHxTotal] : 2 [[facility]] : 1 [PGHxPremature] : 0 [PGHxAbortions] : 1 [[facility] Hospitaliving] : 1 [PGHxABInduced] : 0 [PGHxABSpont] : 1 [PGHxEctopic] : 0 [PGHxMultBirths] : 0

## 2024-01-22 NOTE — PHYSICAL EXAM
[Chaperone Present] : A chaperone was present in the examining room during all aspects of the physical examination [FreeTextEntry1] : Raiza [Appropriately responsive] : appropriately responsive [Alert] : alert [No Acute Distress] : no acute distress [No Lymphadenopathy] : no lymphadenopathy [Regular Rate Rhythm] : regular rate rhythm [No Murmurs] : no murmurs [Clear to Auscultation B/L] : clear to auscultation bilaterally [Soft] : soft [Non-tender] : non-tender [Non-distended] : non-distended [No HSM] : No HSM [No Lesions] : no lesions [No Mass] : no mass [Oriented x3] : oriented x3 [Examination Of The Breasts] : a normal appearance [No Masses] : no breast masses were palpable [Vulvar Atrophy] : vulvar atrophy [Vulvar Scarring] : vulvar scarring [Vulvar Mass ___ cm] : a [unfilled] ~Ucm vulvar mass [Labia Majora] : normal [Labia Minora] : normal [Normal] : normal [Atrophy] : atrophy [Cystocele] : a cystocele [Dry Mucosa] : dry mucosa [Absent] : absent [Uterine Adnexae] : normal [Declined] : Patient declined rectal exam

## 2024-01-23 LAB
C TRACH RRNA SPEC QL NAA+PROBE: NOT DETECTED
N GONORRHOEA RRNA SPEC QL NAA+PROBE: NOT DETECTED
SOURCE TP AMPLIFICATION: NORMAL

## 2024-01-24 LAB — HPV HIGH+LOW RISK DNA PNL CVX: NOT DETECTED

## 2024-01-26 LAB — CYTOLOGY CVX/VAG DOC THIN PREP: ABNORMAL

## 2024-02-01 ENCOUNTER — OUTPATIENT (OUTPATIENT)
Dept: OUTPATIENT SERVICES | Facility: HOSPITAL | Age: 69
LOS: 1 days | End: 2024-02-01
Payer: MEDICARE

## 2024-02-01 ENCOUNTER — APPOINTMENT (OUTPATIENT)
Dept: RADIOLOGY | Facility: CLINIC | Age: 69
End: 2024-02-01
Payer: MEDICARE

## 2024-02-01 DIAGNOSIS — Z98.89 OTHER SPECIFIED POSTPROCEDURAL STATES: Chronic | ICD-10-CM

## 2024-02-01 DIAGNOSIS — Z90.710 ACQUIRED ABSENCE OF BOTH CERVIX AND UTERUS: Chronic | ICD-10-CM

## 2024-02-01 DIAGNOSIS — Z98.1 ARTHRODESIS STATUS: Chronic | ICD-10-CM

## 2024-02-01 DIAGNOSIS — Z00.00 ENCOUNTER FOR GENERAL ADULT MEDICAL EXAMINATION WITHOUT ABNORMAL FINDINGS: ICD-10-CM

## 2024-02-01 PROCEDURE — 77080 DXA BONE DENSITY AXIAL: CPT

## 2024-02-01 PROCEDURE — 77080 DXA BONE DENSITY AXIAL: CPT | Mod: 26

## 2024-02-22 ENCOUNTER — APPOINTMENT (OUTPATIENT)
Dept: OBGYN | Facility: CLINIC | Age: 69
End: 2024-02-22
Payer: MEDICARE

## 2024-02-22 VITALS
HEIGHT: 64 IN | WEIGHT: 198 LBS | SYSTOLIC BLOOD PRESSURE: 128 MMHG | BODY MASS INDEX: 33.8 KG/M2 | DIASTOLIC BLOOD PRESSURE: 84 MMHG

## 2024-02-22 DIAGNOSIS — N90.7 VULVAR CYST: ICD-10-CM

## 2024-02-22 PROCEDURE — 56605 BIOPSY OF VULVA/PERINEUM: CPT

## 2024-02-22 NOTE — PROCEDURE
[Vulvar Biopsy] : Vulvar Biopsy [Time out performed] : Pre-procedure time out performed.  Patient's name, date of birth and procedure confirmed. [Consent Obtained] : Consent obtained [Betadine] : Betadine [Sent to Pathology] : placed in buffered formalin and sent for pathology [Sutures #___] : [unfilled] sutures [Scalpel] : scalpel [Tolerated Well] : the patient tolerated the procedure well [No Complications] : there were no complications

## 2024-03-08 LAB — CORE LAB BIOPSY: NORMAL

## 2024-03-22 ENCOUNTER — APPOINTMENT (OUTPATIENT)
Dept: OBGYN | Facility: CLINIC | Age: 69
End: 2024-03-22
Payer: MEDICARE

## 2024-03-22 VITALS
DIASTOLIC BLOOD PRESSURE: 90 MMHG | SYSTOLIC BLOOD PRESSURE: 130 MMHG | WEIGHT: 196 LBS | BODY MASS INDEX: 33.46 KG/M2 | HEIGHT: 64 IN

## 2024-03-22 DIAGNOSIS — M81.0 AGE-RELATED OSTEOPOROSIS W/OUT CURRENT PATHOLOGICAL FRACTURE: ICD-10-CM

## 2024-03-22 PROCEDURE — 99213 OFFICE O/P EST LOW 20 MIN: CPT

## 2024-03-22 NOTE — COUNSELING
[Body Image] : body image [Nutrition/ Exercise/ Weight Management] : nutrition, exercise, weight management [Vitamins/Supplements] : vitamins/supplements [Sunscreen] : sunscreen [Smoking Cessation] : smoking cessation [Breast Self Exam] : breast self exam [Drugs/Alcohol] : drugs, alcohol [Bladder Hygiene] : bladder hygiene [Confidentiality] : confidentiality

## 2024-03-22 NOTE — DISCUSSION/SUMMARY
[FreeTextEntry1] : 68-year-old patient status post vulvar biopsy for follow-up.  Pathology is benign.  History of osteoporosis been sent for evaluation and treatment.  Return in 6 months for follow-up.

## 2024-03-22 NOTE — HISTORY OF PRESENT ILLNESS
[FreeTextEntry1] : 68-year-old -0-1-1 status post vulvar biopsy pathology is benign.  Bone density shows evidence of osteoporosis and patient is being referred for treatment.

## 2024-04-15 ENCOUNTER — OFFICE (OUTPATIENT)
Dept: URBAN - METROPOLITAN AREA CLINIC 115 | Facility: CLINIC | Age: 69
Setting detail: OPHTHALMOLOGY
End: 2024-04-15
Payer: MEDICARE

## 2024-04-15 DIAGNOSIS — H35.033: ICD-10-CM

## 2024-04-15 DIAGNOSIS — H40.1131: ICD-10-CM

## 2024-04-15 DIAGNOSIS — H16.9: ICD-10-CM

## 2024-04-15 DIAGNOSIS — Z96.1: ICD-10-CM

## 2024-04-15 PROCEDURE — 92083 EXTENDED VISUAL FIELD XM: CPT | Performed by: OPHTHALMOLOGY

## 2024-04-15 PROCEDURE — 99213 OFFICE O/P EST LOW 20 MIN: CPT | Performed by: OPHTHALMOLOGY

## 2024-09-09 ENCOUNTER — OFFICE (OUTPATIENT)
Dept: URBAN - METROPOLITAN AREA CLINIC 115 | Facility: CLINIC | Age: 69
Setting detail: OPHTHALMOLOGY
End: 2024-09-09
Payer: MEDICARE

## 2024-09-09 DIAGNOSIS — Z96.1: ICD-10-CM

## 2024-09-09 DIAGNOSIS — H16.9: ICD-10-CM

## 2024-09-09 DIAGNOSIS — H40.1131: ICD-10-CM

## 2024-09-09 DIAGNOSIS — H35.033: ICD-10-CM

## 2024-09-09 PROCEDURE — 99213 OFFICE O/P EST LOW 20 MIN: CPT | Performed by: OPHTHALMOLOGY

## 2024-09-09 PROCEDURE — 92133 CPTRZD OPH DX IMG PST SGM ON: CPT | Performed by: OPHTHALMOLOGY

## 2024-09-09 ASSESSMENT — CONFRONTATIONAL VISUAL FIELD TEST (CVF)
OS_FINDINGS: FULL
OD_FINDINGS: FULL

## 2025-01-27 ENCOUNTER — OFFICE (OUTPATIENT)
Dept: URBAN - METROPOLITAN AREA CLINIC 115 | Facility: CLINIC | Age: 70
Setting detail: OPHTHALMOLOGY
End: 2025-01-27
Payer: MEDICARE

## 2025-01-27 DIAGNOSIS — H16.9: ICD-10-CM

## 2025-01-27 DIAGNOSIS — H40.1131: ICD-10-CM

## 2025-01-27 DIAGNOSIS — Z96.1: ICD-10-CM

## 2025-01-27 PROCEDURE — 99213 OFFICE O/P EST LOW 20 MIN: CPT | Performed by: OPHTHALMOLOGY

## 2025-01-27 PROCEDURE — 92020 GONIOSCOPY: CPT | Performed by: OPHTHALMOLOGY

## 2025-01-27 ASSESSMENT — REFRACTION_MANIFEST
OS_CYLINDER: -1.00
OD_ADD: +2.75
OS_CYLINDER: -0.75
OD_AXIS: 110
OS_VA1: 20/20-1
OS_ADD: +2.50
OD_SPHERE: +0.25
OD_VA1: 20/NI
OS_SPHERE: 0.00
OS_AXIS: 090
OD_AXIS: 147
OD_ADD: +2.50
OS_VA1: 20/NI
OD_CYLINDER: -1.00
OU_VA: 20/NI
OS_SPHERE: -2.00
OD_SPHERE: 0.00
OD_VA1: 20/20
OS_AXIS: 039
OD_CYLINDER: -0.50
OS_ADD: +2.75

## 2025-01-27 ASSESSMENT — CONFRONTATIONAL VISUAL FIELD TEST (CVF)
OS_FINDINGS: FULL
OD_FINDINGS: FULL

## 2025-01-27 ASSESSMENT — REFRACTION_CURRENTRX
OS_AXIS: 083
OD_AXIS: 113
OS_SPHERE: -1.75
OD_OVR_VA: 20/
OD_ADD: +2.50
OD_SPHERE: -0.50
OS_OVR_VA: 20/
OS_VPRISM_DIRECTION: PROGS
OS_CYLINDER: -0.50
OD_CYLINDER: -1.00
OD_VPRISM_DIRECTION: PROGS
OS_ADD: +2.75

## 2025-01-27 ASSESSMENT — REFRACTION_AUTOREFRACTION
OS_AXIS: 073
OD_CYLINDER: -0.50
OS_SPHERE: +0.75
OD_SPHERE: +0.75
OS_CYLINDER: -0.75
OD_AXIS: 104

## 2025-01-27 ASSESSMENT — TONOMETRY
OD_IOP_MMHG: 11
OS_IOP_MMHG: 12

## 2025-01-27 ASSESSMENT — VISUAL ACUITY
OD_BCVA: 20/25-
OS_BCVA: 20/25

## 2025-01-27 ASSESSMENT — PACHYMETRY
OS_CT_CORRECTION: 4
OS_CT_UM: 484
OD_CT_UM: 490
OD_CT_CORRECTION: 4

## 2025-01-27 ASSESSMENT — SUPERFICIAL PUNCTATE KERATITIS (SPK)
OS_SPK: MILD
OD_SPK: TRACE

## 2025-02-04 ENCOUNTER — APPOINTMENT (OUTPATIENT)
Dept: OBGYN | Facility: CLINIC | Age: 70
End: 2025-02-04
Payer: MEDICARE

## 2025-02-04 VITALS
BODY MASS INDEX: 30.93 KG/M2 | DIASTOLIC BLOOD PRESSURE: 82 MMHG | SYSTOLIC BLOOD PRESSURE: 130 MMHG | HEIGHT: 64 IN | WEIGHT: 181.19 LBS

## 2025-02-04 DIAGNOSIS — N95.2 POSTMENOPAUSAL ATROPHIC VAGINITIS: ICD-10-CM

## 2025-02-04 DIAGNOSIS — M81.0 AGE-RELATED OSTEOPOROSIS W/OUT CURRENT PATHOLOGICAL FRACTURE: ICD-10-CM

## 2025-02-04 DIAGNOSIS — N90.5 ATROPHY OF VULVA: ICD-10-CM

## 2025-02-04 DIAGNOSIS — Z00.00 ENCOUNTER FOR GENERAL ADULT MEDICAL EXAMINATION W/OUT ABNORMAL FINDINGS: ICD-10-CM

## 2025-02-04 PROCEDURE — 99397 PER PM REEVAL EST PAT 65+ YR: CPT

## 2025-02-04 PROCEDURE — 99459 PELVIC EXAMINATION: CPT

## 2025-02-04 PROCEDURE — 99214 OFFICE O/P EST MOD 30 MIN: CPT | Mod: 25

## 2025-02-14 ENCOUNTER — OUTPATIENT (OUTPATIENT)
Dept: OUTPATIENT SERVICES | Facility: HOSPITAL | Age: 70
LOS: 1 days | End: 2025-02-14
Payer: MEDICARE

## 2025-02-14 ENCOUNTER — APPOINTMENT (OUTPATIENT)
Dept: MAMMOGRAPHY | Facility: CLINIC | Age: 70
End: 2025-02-14
Payer: MEDICARE

## 2025-02-14 ENCOUNTER — RESULT REVIEW (OUTPATIENT)
Age: 70
End: 2025-02-14

## 2025-02-14 DIAGNOSIS — Z90.710 ACQUIRED ABSENCE OF BOTH CERVIX AND UTERUS: Chronic | ICD-10-CM

## 2025-02-14 DIAGNOSIS — Z98.89 OTHER SPECIFIED POSTPROCEDURAL STATES: Chronic | ICD-10-CM

## 2025-02-14 DIAGNOSIS — Z12.39 ENCOUNTER FOR OTHER SCREENING FOR MALIGNANT NEOPLASM OF BREAST: ICD-10-CM

## 2025-02-14 DIAGNOSIS — Z00.8 ENCOUNTER FOR OTHER GENERAL EXAMINATION: ICD-10-CM

## 2025-02-14 DIAGNOSIS — Z98.1 ARTHRODESIS STATUS: Chronic | ICD-10-CM

## 2025-02-14 PROCEDURE — 77063 BREAST TOMOSYNTHESIS BI: CPT

## 2025-02-14 PROCEDURE — 77067 SCR MAMMO BI INCL CAD: CPT | Mod: 26

## 2025-02-14 PROCEDURE — 77063 BREAST TOMOSYNTHESIS BI: CPT | Mod: 26

## 2025-02-14 PROCEDURE — 77067 SCR MAMMO BI INCL CAD: CPT

## 2025-05-27 ENCOUNTER — NON-APPOINTMENT (OUTPATIENT)
Age: 70
End: 2025-05-27

## 2025-06-10 ENCOUNTER — APPOINTMENT (OUTPATIENT)
Dept: OBGYN | Facility: CLINIC | Age: 70
End: 2025-06-10
Payer: MEDICARE

## 2025-06-10 VITALS
WEIGHT: 177 LBS | HEIGHT: 64 IN | DIASTOLIC BLOOD PRESSURE: 70 MMHG | BODY MASS INDEX: 30.22 KG/M2 | SYSTOLIC BLOOD PRESSURE: 112 MMHG

## 2025-06-10 PROCEDURE — 99214 OFFICE O/P EST MOD 30 MIN: CPT

## 2025-06-10 PROCEDURE — 99459 PELVIC EXAMINATION: CPT

## 2025-07-22 ENCOUNTER — OFFICE (OUTPATIENT)
Dept: URBAN - METROPOLITAN AREA CLINIC 115 | Facility: CLINIC | Age: 70
Setting detail: OPHTHALMOLOGY
End: 2025-07-22
Payer: MEDICARE

## 2025-07-22 DIAGNOSIS — Z96.1: ICD-10-CM

## 2025-07-22 DIAGNOSIS — H16.9: ICD-10-CM

## 2025-07-22 DIAGNOSIS — H40.1131: ICD-10-CM

## 2025-07-22 PROCEDURE — 92250 FUNDUS PHOTOGRAPHY W/I&R: CPT | Performed by: OPHTHALMOLOGY

## 2025-07-22 PROCEDURE — 99213 OFFICE O/P EST LOW 20 MIN: CPT | Performed by: OPHTHALMOLOGY

## 2025-07-22 ASSESSMENT — REFRACTION_CURRENTRX
OS_VPRISM_DIRECTION: PROGS
OS_SPHERE: -1.75
OD_OVR_VA: 20/
OS_ADD: +2.75
OD_VPRISM_DIRECTION: PROGS
OD_AXIS: 113
OS_AXIS: 083
OS_OVR_VA: 20/
OD_ADD: +2.50
OD_CYLINDER: -1.00
OD_SPHERE: -0.50
OS_CYLINDER: -0.50

## 2025-07-22 ASSESSMENT — PACHYMETRY
OS_CT_CORRECTION: 4
OD_CT_UM: 490
OD_CT_CORRECTION: 4
OS_CT_UM: 484

## 2025-07-22 ASSESSMENT — REFRACTION_MANIFEST
OS_ADD: +2.50
OU_VA: 20/NI
OD_VA1: 20/20
OS_VA1: 20/20-1
OD_SPHERE: 0.00
OD_CYLINDER: -1.00
OS_VA1: 20/NI
OD_ADD: +2.75
OS_AXIS: 090
OD_SPHERE: +0.25
OS_CYLINDER: -1.00
OD_VA1: 20/NI
OS_CYLINDER: -0.75
OD_ADD: +2.50
OS_SPHERE: 0.00
OD_AXIS: 110
OS_ADD: +2.75
OS_AXIS: 039
OS_SPHERE: -2.00
OD_CYLINDER: -0.50
OD_AXIS: 147

## 2025-07-22 ASSESSMENT — VISUAL ACUITY
OD_BCVA: 20/20
OS_BCVA: 20/20-1

## 2025-07-22 ASSESSMENT — CONFRONTATIONAL VISUAL FIELD TEST (CVF)
OD_FINDINGS: FULL
OS_FINDINGS: FULL

## 2025-07-22 ASSESSMENT — REFRACTION_AUTOREFRACTION
OD_CYLINDER: -0.75
OS_SPHERE: +0.50
OD_SPHERE: +0.75
OS_AXIS: 075
OD_AXIS: 099
OS_CYLINDER: -0.75

## 2025-07-22 ASSESSMENT — TONOMETRY
OD_IOP_MMHG: 13
OS_IOP_MMHG: 12

## 2025-07-22 ASSESSMENT — SUPERFICIAL PUNCTATE KERATITIS (SPK)
OS_SPK: MILD
OD_SPK: TRACE